# Patient Record
Sex: MALE | Race: WHITE | Employment: FULL TIME | URBAN - METROPOLITAN AREA
[De-identification: names, ages, dates, MRNs, and addresses within clinical notes are randomized per-mention and may not be internally consistent; named-entity substitution may affect disease eponyms.]

---

## 2019-11-25 ENCOUNTER — HOSPITAL ENCOUNTER (EMERGENCY)
Age: 19
Discharge: HOME | End: 2019-11-25
Payer: COMMERCIAL

## 2019-11-25 ENCOUNTER — APPOINTMENT (OUTPATIENT)
Dept: GENERAL RADIOLOGY | Age: 19
End: 2019-11-25
Payer: COMMERCIAL

## 2019-11-25 VITALS
HEART RATE: 86 BPM | DIASTOLIC BLOOD PRESSURE: 52 MMHG | RESPIRATION RATE: 16 BRPM | TEMPERATURE: 98 F | OXYGEN SATURATION: 97 % | SYSTOLIC BLOOD PRESSURE: 167 MMHG

## 2019-11-25 DIAGNOSIS — S61.213A LACERATION OF LEFT MIDDLE FINGER WITHOUT FOREIGN BODY WITHOUT DAMAGE TO NAIL, INITIAL ENCOUNTER: Primary | ICD-10-CM

## 2019-11-25 PROCEDURE — 6370000000 HC RX 637 (ALT 250 FOR IP): Performed by: NURSE PRACTITIONER

## 2019-11-25 PROCEDURE — 2500000003 HC RX 250 WO HCPCS: Performed by: NURSE PRACTITIONER

## 2019-11-25 PROCEDURE — 73130 X-RAY EXAM OF HAND: CPT

## 2019-11-25 PROCEDURE — 99282 EMERGENCY DEPT VISIT SF MDM: CPT

## 2019-11-25 PROCEDURE — 12001 RPR S/N/AX/GEN/TRNK 2.5CM/<: CPT

## 2019-11-25 RX ORDER — IBUPROFEN 800 MG/1
800 TABLET ORAL ONCE
Status: COMPLETED | OUTPATIENT
Start: 2019-11-25 | End: 2019-11-25

## 2019-11-25 RX ORDER — LIDOCAINE HYDROCHLORIDE 10 MG/ML
5 INJECTION, SOLUTION EPIDURAL; INFILTRATION; INTRACAUDAL; PERINEURAL ONCE
Status: COMPLETED | OUTPATIENT
Start: 2019-11-25 | End: 2019-11-25

## 2019-11-25 RX ORDER — DIAPER,BRIEF,INFANT-TODD,DISP
EACH MISCELLANEOUS ONCE
Status: COMPLETED | OUTPATIENT
Start: 2019-11-25 | End: 2019-11-25

## 2019-11-25 RX ORDER — CEPHALEXIN 500 MG/1
500 CAPSULE ORAL 3 TIMES DAILY
Qty: 30 CAPSULE | Refills: 0 | Status: SHIPPED | OUTPATIENT
Start: 2019-11-25 | End: 2019-12-05

## 2019-11-25 RX ORDER — IBUPROFEN 800 MG/1
800 TABLET ORAL EVERY 6 HOURS PRN
Qty: 16 TABLET | Refills: 0 | Status: SHIPPED | OUTPATIENT
Start: 2019-11-25 | End: 2020-09-04 | Stop reason: ALTCHOICE

## 2019-11-25 RX ORDER — BACITRACIN ZINC AND POLYMYXIN B SULFATE 500; 1000 [USP'U]/G; [USP'U]/G
OINTMENT TOPICAL
Qty: 30 G | Refills: 0 | Status: SHIPPED | OUTPATIENT
Start: 2019-11-25 | End: 2019-12-02

## 2019-11-25 RX ADMIN — IBUPROFEN 800 MG: 800 TABLET, FILM COATED ORAL at 11:39

## 2019-11-25 RX ADMIN — BACITRACIN ZINC: 500 OINTMENT TOPICAL at 13:00

## 2019-11-25 RX ADMIN — LIDOCAINE HYDROCHLORIDE 5 ML: 10 INJECTION, SOLUTION EPIDURAL; INFILTRATION; INTRACAUDAL; PERINEURAL at 13:00

## 2019-11-25 SDOH — HEALTH STABILITY: MENTAL HEALTH: HOW OFTEN DO YOU HAVE A DRINK CONTAINING ALCOHOL?: NEVER

## 2019-11-25 ASSESSMENT — PAIN SCALES - GENERAL
PAINLEVEL_OUTOF10: 3
PAINLEVEL_OUTOF10: 3

## 2019-11-27 ENCOUNTER — TELEPHONE (OUTPATIENT)
Dept: ORTHOPEDIC SURGERY | Age: 19
End: 2019-11-27

## 2019-12-02 ENCOUNTER — TELEPHONE (OUTPATIENT)
Dept: ORTHOPEDIC SURGERY | Age: 19
End: 2019-12-02

## 2019-12-02 ENCOUNTER — TELEPHONE (OUTPATIENT)
Dept: ADMINISTRATIVE | Age: 19
End: 2019-12-02

## 2020-09-04 ENCOUNTER — HOSPITAL ENCOUNTER (EMERGENCY)
Age: 20
Discharge: HOME | End: 2020-09-04
Payer: COMMERCIAL

## 2020-09-04 VITALS
HEART RATE: 79 BPM | OXYGEN SATURATION: 98 % | DIASTOLIC BLOOD PRESSURE: 70 MMHG | BODY MASS INDEX: 24.25 KG/M2 | HEIGHT: 68 IN | WEIGHT: 160 LBS | SYSTOLIC BLOOD PRESSURE: 138 MMHG | RESPIRATION RATE: 18 BRPM | TEMPERATURE: 98.7 F

## 2020-09-04 PROCEDURE — 99212 OFFICE O/P EST SF 10 MIN: CPT

## 2020-09-04 NOTE — ED PROVIDER NOTES
Department of Emergency Medicine   ED  Provider Note  Admit Date/RoomTime: 9/4/2020  5:32 PM  ED Room: 04/04  Chief Complaint:   Nasal Congestion and Cough    History of Present Illness   Source of history provided by:  patient. History/Exam Limitations: none. Nery Donohue is a 23 y.o. old male with a past medical history of: History reviewed. No pertinent past medical history. presents to the emergency department with a complaint of cold. Patient states for the past 2 days he has had a head cold. Sinus congestion. Runny nose. Slight cough. Patient denies fevers or chills. Denies shortness of breath. Denies chest pain. Denies nausea, vomiting, diarrhea. ROS   Pertinent positives and negatives are stated within HPI, all other systems reviewed and are negative. Past Surgical History:  has no past surgical history on file. Social History:  reports that he has never smoked. He has never used smokeless tobacco. He reports that he does not drink alcohol or use drugs. Family History: family history is not on file. Allergies: Amoxil [amoxicillin] and Strawberry flavor    Physical Exam           ED Triage Vitals [09/04/20 1733]   BP Temp Temp Source Pulse Resp SpO2 Height Weight   138/70 98.7 °F (37.1 °C) Infrared 79 18 98 % 5' 8\" (1.727 m) 160 lb (72.6 kg)      Oxygen Saturation Interpretation: Normal.    General:  NAD. Alert and Oriented. Well-appearing. Skin:  Warm, dry. No rashes. Head:  Normocephalic. Atraumatic. Eyes:  EOMI. Conjunctiva normal.  ENT:  Oral mucosa moist.  Airway patent. Posterior pharynx with red streaking and cobblestoning to the posterior pharynx wall. Tonsils are not erythematous, not swollen, negative exudate. Bilateral TMs show a slight bulge, negative erythema. Positive nasal congestion with clear drainage. Neck:  Supple. Normal ROM. Respiratory:  No respiratory distress. No labored breathing.   Lungs clear without rales, rhonchi or wheezing. Cardiovascular:  Regular rate. No Murmur. No peripheral edema. Extremities warm and good color. Extremities:  Normal ROM. Nontender to palpation. Atraumatic. Back:  Normal ROM. Nontender to palpation. Neuro:  Alert and Oriented to person, place, time and situation. Normal LOC. Moves all extremities. Speech fluent. Psych:  Calm and Cooperative. Normal thought process. Normal judgement. Lab / Imaging Results   (All laboratory and radiology results have been personally reviewed by myself)  Labs:  No results found for this visit on 09/04/20. Imaging: All Radiology results interpreted by Radiologist unless otherwise noted. No orders to display       ED Course / Medical Decision Making   Medications - No data to display     Re-examination:  9/4/20       Time:     Patients symptoms . Consults:   None    Procedures:   none    Medical Decision Making:    Advised patient that if he wants tested for coronavirus that he can go to any of the outpatient testing centers. A diagnosis of Upper Respiratory Infection is most likely viral in nature. Antibiotics have no effect on viruses and is not prescribed for viral illnesses. It is recommended that you increase your fluid intake; take otc Tylenol or Motrin as needed; place a vaporizer or humidifier in your room to moisten the air; drink warm teas with honey. OTC decongestants may or may not benefit you. URI's can last 10-14 days. If you feel that your symptoms are worsening or you have any concerns please follow up with your family doctor or go to your closest ED/Urgent care. Counseling: The emergency provider has spoken with the patient and discussed todays results, in addition to providing specific details for the plan of care and counseling regarding the diagnosis and prognosis. Questions are answered at this time and they are agreeable with the plan. Assessment     1.  Viral URI with cough New Problem     Plan Discharge to home  Patient condition is good    New Medications     New Prescriptions    No medications on file     Electronically signed by GUI Logan   DD: 9/4/20  **This report was transcribed using voice recognition software. Every effort was made to ensure accuracy; however, inadvertent computerized transcription errors may be present.   END OF ED PROVIDER NOTE       Romina Logan  09/04/20 6011

## 2020-09-05 ENCOUNTER — CARE COORDINATION (OUTPATIENT)
Dept: CARE COORDINATION | Age: 20
End: 2020-09-05

## 2020-09-05 NOTE — CARE COORDINATION
Patient contacted regarding Michelle Petersen. Discussed COVID-19 related testing which was not done at this time. Test results were not done. Patient informed of results, if available? N/A    Care Transition Nurse/ Ambulatory Care Manager contacted the patient by telephone to perform post discharge assessment. Call within 2 business days of discharge: Yes. Verified name and  with patient as identifiers. Provided introduction to self, and explanation of the CTN/ACM role, and reason for call due to risk factors for infection and/or exposure to COVID-19. Symptoms reviewed with patient who verbalized the following symptoms: no new symptoms and no worsening symptoms. Reports that he is feeling better and is taking OTC medications. Due to no new or worsening symptoms encounter was not routed to provider for escalation. Discussed follow-up appointments. If no appointment was previously scheduled, appointment scheduling offered: Yes  [facility] follow up appointment(s): No future appointments. Non-[facility] follow up appointment(s): N/A    Non-face-to-face services provided:  Reports that he will not be following up with his PCP as he feels better and will take OTC medications. Advance Care Planning:   Does patient have an Advance Directive:  reviewed and current. Patient has following risk factors of: no known risk factors. CTN/ACM reviewed discharge instructions, medical action plan and red flags such as increased shortness of breath, increasing fever and signs of decompensation with patient who verbalized understanding. Discussed exposure protocols and quarantine with CDC Guidelines What to do if you are sick with coronavirus disease 2019.  Patient was given an opportunity for questions and concerns. The patient agrees to contact the [facility] exposure line [phone], [facility] department [place] [facility]: ([phone]) and PCP office for questions related to their healthcare.  CTN/ACM provided contact information for future needs. Reviewed and educated patient on any new and changed medications related to discharge diagnosis     Patient/family/caregiver given information for [facility] Loop and agrees to enroll no  Patient's preferred e-mail: N/A   Patient's preferred phone number: N/A  Based on Loop alert triggers, patient will be contacted by nurse care manager for worsening symptoms. Pt declines any further outreaches. based on severity of symptoms and risk factors. Will resolve episode.

## 2023-02-21 ENCOUNTER — OFFICE VISIT (OUTPATIENT)
Dept: FAMILY MEDICINE CLINIC | Age: 23
End: 2023-02-21

## 2023-02-21 VITALS
WEIGHT: 181 LBS | DIASTOLIC BLOOD PRESSURE: 88 MMHG | HEART RATE: 75 BPM | HEIGHT: 68 IN | OXYGEN SATURATION: 97 % | RESPIRATION RATE: 16 BRPM | BODY MASS INDEX: 27.43 KG/M2 | TEMPERATURE: 98.5 F | SYSTOLIC BLOOD PRESSURE: 130 MMHG

## 2023-02-21 DIAGNOSIS — K43.9 VENTRAL HERNIA WITHOUT OBSTRUCTION OR GANGRENE: ICD-10-CM

## 2023-02-21 DIAGNOSIS — Z76.89 ESTABLISHING CARE WITH NEW DOCTOR, ENCOUNTER FOR: ICD-10-CM

## 2023-02-21 DIAGNOSIS — Z01.812 PRE-PROCEDURE LAB EXAM: ICD-10-CM

## 2023-02-21 DIAGNOSIS — Z71.3 DIETARY COUNSELING: ICD-10-CM

## 2023-02-21 DIAGNOSIS — Z71.85 VACCINE COUNSELING: ICD-10-CM

## 2023-02-21 DIAGNOSIS — N50.811 PAIN IN RIGHT TESTICLE: Primary | ICD-10-CM

## 2023-02-21 DIAGNOSIS — Z71.82 EXERCISE COUNSELING: ICD-10-CM

## 2023-02-21 DIAGNOSIS — Q55.22 RETRACTILE TESTIS: ICD-10-CM

## 2023-02-21 LAB
ANION GAP SERPL CALCULATED.3IONS-SCNC: 7 MMOL/L (ref 7–16)
BUN BLDV-MCNC: 17 MG/DL (ref 6–20)
CALCIUM SERPL-MCNC: 9.4 MG/DL (ref 8.6–10.2)
CHLORIDE BLD-SCNC: 105 MMOL/L (ref 98–107)
CO2: 28 MMOL/L (ref 22–29)
CREAT SERPL-MCNC: 1 MG/DL (ref 0.7–1.2)
GFR SERPL CREATININE-BSD FRML MDRD: >60 ML/MIN/1.73
GLUCOSE BLD-MCNC: 97 MG/DL (ref 74–99)
POTASSIUM SERPL-SCNC: 4.2 MMOL/L (ref 3.5–5)
SODIUM BLD-SCNC: 140 MMOL/L (ref 132–146)

## 2023-02-21 RX ORDER — OMEPRAZOLE 20 MG/1
CAPSULE, DELAYED RELEASE ORAL
COMMUNITY
Start: 2023-02-16

## 2023-02-21 SDOH — ECONOMIC STABILITY: INCOME INSECURITY: HOW HARD IS IT FOR YOU TO PAY FOR THE VERY BASICS LIKE FOOD, HOUSING, MEDICAL CARE, AND HEATING?: NOT HARD AT ALL

## 2023-02-21 SDOH — ECONOMIC STABILITY: FOOD INSECURITY: WITHIN THE PAST 12 MONTHS, THE FOOD YOU BOUGHT JUST DIDN'T LAST AND YOU DIDN'T HAVE MONEY TO GET MORE.: NEVER TRUE

## 2023-02-21 SDOH — ECONOMIC STABILITY: HOUSING INSECURITY
IN THE LAST 12 MONTHS, WAS THERE A TIME WHEN YOU DID NOT HAVE A STEADY PLACE TO SLEEP OR SLEPT IN A SHELTER (INCLUDING NOW)?: NO

## 2023-02-21 SDOH — ECONOMIC STABILITY: FOOD INSECURITY: WITHIN THE PAST 12 MONTHS, YOU WORRIED THAT YOUR FOOD WOULD RUN OUT BEFORE YOU GOT MONEY TO BUY MORE.: NEVER TRUE

## 2023-02-21 ASSESSMENT — PATIENT HEALTH QUESTIONNAIRE - PHQ9
SUM OF ALL RESPONSES TO PHQ QUESTIONS 1-9: 0
SUM OF ALL RESPONSES TO PHQ QUESTIONS 1-9: 0
SUM OF ALL RESPONSES TO PHQ9 QUESTIONS 1 & 2: 0
1. LITTLE INTEREST OR PLEASURE IN DOING THINGS: 0
SUM OF ALL RESPONSES TO PHQ QUESTIONS 1-9: 0
2. FEELING DOWN, DEPRESSED OR HOPELESS: 0
SUM OF ALL RESPONSES TO PHQ QUESTIONS 1-9: 0

## 2023-02-21 NOTE — PROGRESS NOTES
Zack Starks (:  2000) is a 20 y.o. male,New patient, here for evaluation of the following chief complaint(s):  Establish Care (Former  Patient), Hernia (Has seen Karine Patel ), and Health Maintenance (Due for HPV Vaccine Flu Vaccine, Covid Vaccine, Tdap, Hep C Screen)         ASSESSMENT/PLAN:  1. Pain in right testicle  -     STEFANIA Raymundo MD, Urology, [address] [place] [address]  2. Retractile testis  -     STEFANIA Raymundo MD, Urology, [address]  3. Ventral hernia without obstruction or gangrene  If alarm signs or symptoms develop as we discussed, report immediately to the emergency department/dial . Ventral hernia, unusual in the epigastrium  Painful when this occurs, reducible when it does  No hematochezia hemoptysis melena   CT abd pelvis with iv contrast to better evaluate   NOT rectus diastasis on exam today   4. Dietary counseling  Counseled the patient on diet, continue to make positive choices. It is important to focus on meeting food group needs with nutrient dense foods and stay within caloric limits. Nutrient dense foods will provide you with vitamins, minerals, and other health promoting nutrients. You should avoid added sugars, saturated fats, hydrogenated oils, and limit sodium intake (this isn't just table salt. .. turn the package over, read the label, stay under 2000 mg or 2g of total sodium daily). Track your calories, this will help you get an understanding of what a proper portion size is. Every day you should eat these:  -Veggies of all types  -Fruits  -Grains (strive for at least half of these to be whole-grain)  -Lean protein (poultry, fish, legumes, nuts)    Stick to the plate diet.    -22% of your dinner plate should be leafy green vegetables, dark green, red and orange vegetables. Do not use high-calorie dressing is a ranch or dressings that are filled with added sugars.   25% of your dinner plate should be whole grains. The remaining 25% of your dinner plate should be a lean protein. Limit your red meat intake to once per week and no more than 4 ounces in any serving.  -No need for second helpings. Limit or avoid these foods:  -Added sugars (less than 10% of your total calories in any given day should be from sugars)  -Saturated fats and hydrogenated oils (less than 10% of your total calories in any given day should be from these)  -Sodium (less than 2000 mg/day)  -Alcoholic beverages (even in moderation, alcohol can cause long-term health issues involving hypertension, electrolyte abnormalities, liver disease and even cancers of the mouth and throat)    Stay hydrated. Unless instructed otherwise by your physician, you should strive to drink at least eight 8 ounce glasses of water daily, some of these being fortified with electrolytes (such as a Pedialyte, or low calorie sports drink). Again, avoid added sugars. 5. Exercise counseling  Counseled the patient on importance of cardiovascular and resistance training. Make every attempt to engage in a level of activity, sustained for at least 30 minutes, where you saturate your undergarments with sweat. This should be done, at a minimum, three times per week. 6. Vaccine counseling  -Risk and benefit discussion  -Review of any pertinent information regarding potential contraindications to receiving particular vaccine(s)  -Review the relevant CDC Vaccine Information Statement(s) (VIS)  -Detailed informed consent for each vaccine  -Addressed all concerns and questions related to vaccines and immunization administration      7. Establishing care with new doctor, encounter for  All personal, family, social, surgical, medical history is reviewed and updated with patient. Allergies, medications updated. List of specialists follows with updated. DM/HM updated. No follow-ups on file.          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 Ayad Manrique is following  [facility] daily     Testicular issue:  -years  -notices right side is riding high, can manually pull down then it goes back up slowly, never into the inguinal canal, always palpable within the scrotum and with palpation it is sometimes dull ache/painful  -no associated penile draining, hematuria, fevers, chills, diaphoresis, b-symptoms    PSYCH  PHQ-9 Total Score: 0 (2/21/2023  3:17 PM)    Preventative:  Health Maintenance   Topic Date Due    HPV vaccine (1 - Male 2-dose series) Never done    Depression Screen  Never done    COVID-19 Vaccine (3 - Booster for Coley Peter series) 11/28/2021    Flu vaccine (1) 02/10/2026 (Originally 8/1/2022)    DTaP/Tdap/Td vaccine (8 - Td or Tdap) 05/21/2031    Hib vaccine  Completed    Meningococcal (ACWY) vaccine  Completed    Hepatitis A vaccine  Aged Out    Pneumococcal 0-64 years Vaccine  Aged Out    Varicella vaccine  Discontinued    Hepatitis C screen  Discontinued    HIV screen  Discontinued           ROS:    Denies 10pt ROS other than noted in HPI. Objective       PHYSICAL EXAM:    /88   Pulse 75   Temp 98.5 °F (36.9 °C) (Temporal)   Resp 16   Ht 5' 8\" (1.727 m)   Wt 181 lb (82.1 kg)   SpO2 97%   BMI 27.52 kg/m²     AVSS    GA: Well-groomed, appears well, no acute distress. HEENT: Atraumatic normocephalic. Extraocular muscles are grossly intact. Pupils are equal round reactive to light. Conjunctiva pink and moist.  Hearing is grossly intact. Nares patent without external drainage. Buccal mucosa pink and moist.  Posterior oropharynx clear without lesion or exudate. NECK: Trachea is midline, supple, nontender, no lymphadenopathy. CARDIO: Regular rate and rhythm without murmur rub or gallop. Cap refill 2+. Radial pulses 2+ bilaterally.     RESPIRATORY: Clear to auscultation bilaterally without wheezes rales or rhonchi. Normal inspiratory and expiratory effort. Normoxic on room air    ABD: flat, normoactive bowel sounds. Soft, nontender, no organomegaly. There is a palpable defect in the epigastrium, no abdominal contents, nothing to reduce but presumed fat. MSK: Structurally appropriate for age. No gross deficit. No rectus diastasis. : chaperoned exam. External genitalia wnl, circumcised. Readily apparent, the right testicle appears transverse and held higher than the left. Manual traction without pain or discomfort, slowly testicle returns to this position. Cremasteric reflex is intact, did not worsen/exacerbate apparent discordance wrt to position of testicles. There is no pain or palpable mass. NEURO: Alert, no gross deficit. PSYCH:  Mood is normal and congruent with affect. No signs of psychomotor retardation or agitation. Thought content seems normal, speech is fluent and non-pressured. SKIN: Generally warm pink and dry. An electronic signature was used to authenticate this note.     --Desi Nguyễn, DO

## 2023-02-21 NOTE — PATIENT INSTRUCTIONS
GERD  -No food within 3 hours of laying to bed (same goes for medications with the exception of sleep aids that are 1 hour prior to bed)  -No drink within 1 hour of laying to bed  -Elevate the head of the bed by 30 degrees (1/2 heigh cinder blocks work well)   -Keep food diary and know/avoid triggers      ___________________________________________________________________    -Carbohydrates limit to 40 to 50g per meal (120g to 150g per day)  -Fats limit to 60g per day (total fat intake should be 30% to 35% of your total daily calories, so restrict to whichever number is lower)   -Of the fats you do eat, never eat trans fats, never eat unsaturated fats, limit your saturated fat intake to less than 20g per day (or 7% of your total daily calories, so restrict to whichever number is lower)  -Cholesterol limit to no more than 300mg per day (200mg per day if you have elevated triglycerides or total cholesterol)  -Sodium less than 2000mg per day    MyFitnessPal or similar application (or track by journal) to monitor calories and macronutrients. This is the most critical component to a heart healthy, balanced diet: honesty, keeping oneself accountable, ensure you are tracking daily goals and meeting them. Consider using service such as [employer] [never select keto, only select mediterranean or everything diets . .. enter the calorie goal above]    Food is medicine! Counseled the patient on importance of cardiovascular and resistance training. Make every attempt to engage in a level of activity, sustained for at least 30 minutes, where you saturate your undergarments with sweat. This should be done, at a minimum, three times per week.

## 2023-03-11 ENCOUNTER — HOSPITAL ENCOUNTER (OUTPATIENT)
Dept: CT IMAGING | Age: 23
End: 2023-03-11
Payer: COMMERCIAL

## 2023-03-11 DIAGNOSIS — K43.9 VENTRAL HERNIA WITHOUT OBSTRUCTION OR GANGRENE: ICD-10-CM

## 2023-03-11 PROCEDURE — 74177 CT ABD & PELVIS W/CONTRAST: CPT

## 2023-03-11 PROCEDURE — 6360000004 HC RX CONTRAST MEDICATION: Performed by: RADIOLOGY

## 2023-03-11 RX ADMIN — IOPAMIDOL 50 ML: 755 INJECTION, SOLUTION INTRAVENOUS at 09:26

## 2023-04-21 ENCOUNTER — NURSE ONLY (OUTPATIENT)
Dept: FAMILY MEDICINE CLINIC | Age: 23
End: 2023-04-21

## 2023-04-21 DIAGNOSIS — Z23 VACCINE FOR HUMAN PAPILLOMA VIRUS (HPV) TYPES 6, 11, 16, AND 18 ADMINISTERED: Primary | ICD-10-CM

## 2023-05-23 ENCOUNTER — APPOINTMENT (OUTPATIENT)
Dept: GENERAL RADIOLOGY | Age: 23
End: 2023-05-23
Payer: COMMERCIAL

## 2023-05-23 ENCOUNTER — HOSPITAL ENCOUNTER (EMERGENCY)
Age: 23
Discharge: HOME | End: 2023-05-23
Payer: COMMERCIAL

## 2023-05-23 VITALS
OXYGEN SATURATION: 97 % | DIASTOLIC BLOOD PRESSURE: 73 MMHG | BODY MASS INDEX: 27.37 KG/M2 | TEMPERATURE: 98.2 F | WEIGHT: 180 LBS | HEART RATE: 69 BPM | RESPIRATION RATE: 18 BRPM | SYSTOLIC BLOOD PRESSURE: 115 MMHG

## 2023-05-23 DIAGNOSIS — S62.124A CLOSED NONDISPLACED FRACTURE OF LUNATE OF RIGHT WRIST, INITIAL ENCOUNTER: Primary | ICD-10-CM

## 2023-05-23 PROCEDURE — 73110 X-RAY EXAM OF WRIST: CPT

## 2023-05-23 PROCEDURE — 29125 APPL SHORT ARM SPLINT STATIC: CPT

## 2023-05-23 PROCEDURE — 99211 OFF/OP EST MAY X REQ PHY/QHP: CPT

## 2023-05-23 PROCEDURE — G0463 HOSPITAL OUTPT CLINIC VISIT: HCPCS

## 2023-05-23 ASSESSMENT — PAIN - FUNCTIONAL ASSESSMENT: PAIN_FUNCTIONAL_ASSESSMENT: NONE - DENIES PAIN

## 2023-05-23 NOTE — DISCHARGE INSTRUCTIONS
Xray shows: IMPRESSION:  Nondisplaced lunate fracture and possible avascular necrosis of the lunate. Please call orthopedics for follow up. Take ibuprofen as directed for pain and inflammation  Maintain splint until follow up with orthopedics.    No lifting right arm

## 2023-05-23 NOTE — ED PROVIDER NOTES
[facility]  EMERGENCY DEPARTMENT ENCOUNTER        NAME: Robin Guerrero  :  2000  MRN:  [id number]  Date of evaluation: 2023  Provider: Mireille Coleman PA-C  PCP: Leilani Thomas DO  Note Started : 5:49 PM EDT 23    Chief Complaint: Wrist Pain (Right wrist pain, for a few months, got worse today, repetitive wrist use at work)      This is a 21-year-old male that presents to urgent care complaining of right wrist pain for the past 4 months. He states he injured his wrist 4 months ago. He does work a active type of job in which he uses his wrist a lot. He noticed today that his wrist area was hurting more. He denies numbness or tingling. He is right-handed. On first contact patient he appears to be in no acute distress. Review of Systems  Pertinent positives and negatives are stated within HPI, all other systems reviewed and are negative. Allergies: Amoxil [amoxicillin], Shellfish-derived products, Strawberry flavor, and Triamcinolone     --------------------------------------------- PAST HISTORY ---------------------------------------------  Past Medical History:  has a past medical history of Environmental allergies. Past Surgical History:  has no past surgical history on file. Social History:  reports that he has never smoked. He has never used smokeless tobacco. He reports that he does not drink alcohol and does not use drugs. Family History: family history includes Diabetes in his paternal grandfather; Heart Attack in his paternal grandfather; High Cholesterol in his father, mother, and paternal grandfather; Hypertension in his mother and paternal grandfather; Mult Sclerosis in his father. The patients home medications have been reviewed. The nursing notes within the ED encounter have been reviewed.      ------------------------------------------------SCREENINGS----------------------------------------------                        ANNALISA

## 2023-05-24 ENCOUNTER — TELEPHONE (OUTPATIENT)
Dept: ADMINISTRATIVE | Age: 23
End: 2023-05-24

## 2023-05-30 ENCOUNTER — OFFICE VISIT (OUTPATIENT)
Dept: ORTHOPEDIC SURGERY | Age: 23
End: 2023-05-30
Payer: COMMERCIAL

## 2023-05-30 VITALS — HEIGHT: 68 IN | WEIGHT: 180 LBS | TEMPERATURE: 98 F | BODY MASS INDEX: 27.28 KG/M2

## 2023-05-30 DIAGNOSIS — M92.211 KIENBOCK'S DISEASE, RIGHT: Primary | ICD-10-CM

## 2023-05-30 DIAGNOSIS — S62.124A CLOSED NONDISPLACED FRACTURE OF LUNATE OF RIGHT WRIST, INITIAL ENCOUNTER: ICD-10-CM

## 2023-05-30 PROCEDURE — 99203 OFFICE O/P NEW LOW 30 MIN: CPT | Performed by: ORTHOPAEDIC SURGERY

## 2023-05-30 NOTE — PROGRESS NOTES
Chief Complaint   Patient presents with    Wrist Injury     Right wrist fx. Patient has been hurting for a few months. Patient was cracking knuckles on 5/23/23 and wrist became swollen and went to . Told he has a fracture in wrist. Patient is right hand dominant. Does now remember any other injury to wrist. Had been trying to take it easy with wrist but now painful. Placed in splint and has still been using wrist.         Tino Kaufman is a 24y.o. year old  male who presents for evaluation of right wrist pain. he reports this started a few months. he does not remember a specific injury that started the pain. .  The injury was none. The pain is located mainly dorsal wrist.  The pain is worse with activity and better with rest.  The patient has tried OTC analgesics, ice, splint in ED . The treatment has not been effective. The patient is right dominant. The patient is employed at labor intensive job. Past Medical History:   Diagnosis Date    Environmental allergies      No past surgical history on file.     Current Outpatient Medications:     omeprazole (PRILOSEC) 20 MG delayed release capsule, , Disp: , Rfl:     VITAMIN D PO, Take by mouth, Disp: , Rfl:   Allergies   Allergen Reactions    Amoxil [Amoxicillin] Other (See Comments)     unknown    Shellfish-Derived Products Hives    Strawberry Flavor Hives    Triamcinolone      Social History     Socioeconomic History    Marital status: Single     Spouse name: Not on file    Number of children: Not on file    Years of education: Not on file    Highest education level: Not on file   Occupational History    Not on file   Tobacco Use    Smoking status: Never    Smokeless tobacco: Never   Vaping Use    Vaping Use: Never used   Substance and Sexual Activity    Alcohol use: Never    Drug use: Never    Sexual activity: Not on file   Other Topics Concern    Not on file   Social History Narrative    Not on file     Social Determinants of Health     Financial

## 2024-01-03 ENCOUNTER — EVALUATION (OUTPATIENT)
Dept: OCCUPATIONAL THERAPY | Age: 24
End: 2024-01-03
Payer: COMMERCIAL

## 2024-01-03 DIAGNOSIS — M92.211 OSTEOCHONDROSIS OF LUNATE OF RIGHT WRIST: Primary | ICD-10-CM

## 2024-01-03 PROCEDURE — 97165 OT EVAL LOW COMPLEX 30 MIN: CPT | Performed by: OCCUPATIONAL THERAPIST

## 2024-01-03 PROCEDURE — 97530 THERAPEUTIC ACTIVITIES: CPT | Performed by: OCCUPATIONAL THERAPIST

## 2024-01-03 NOTE — PROGRESS NOTES
OCCUPATIONAL THERAPY INITIAL EVALUATION    [facility] SPECIALTY [facility] OCCUPATIONAL THERAPY   [place] [address]  Dept: [phone]  Loc: [phone]   [facility] OT Fax: [phone]    Date:  1/3/2024  Initial Evaluation Date: 1/3/2024   Evaluating Therapist: Nupur Starr OT    Patient Name:  Maxx Espinosa    :  2000    Restrictions/Precautions:  Per Radial Osteotomy w/ Lunate Revascularization Pin Protocol, low fall risk  Diagnosis:  R radial osteotomy and lunate revascularization pin neuroma excision and burying of nerve and muscle;   (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 Malu Elliott MD  Specific Practitioner Orders: Eval and treat    Assessment of current deficits   [x] IADLs  [x] Strength   [x] ROM  [x] Edema   [x] Scar Adhesion/Skin Integrity   [x] Motor Endurance     OT PLAN OF CARE   OT POC based on physician orders, patient diagnosis and results of clinical assessment.    Frequency/Duration: 1-2x/wk for 14 visits  /  Certification Period From: 1/3/2024  To: 4/3/2024    Specific OT Treatment to include:   [x] Instruction in HEP                   Modalities:  [x] Therapeutic Exercise        [x] Ultrasound             [x] PROM/Stretching                    [x] Fluidotherapy          [x]  Paraffin                   [x] AAROM  [x] AROM                 [x] Iontophoresis:   [x] Tendon Glides                         [x] Electrical Stimulation/Attended                    [x] ADL/IADL re-training         [x] Desensitization/Nerve Stimulation     [x] Therapeutic Activity       [x] Pain Management with/without modalities PRN  [x] Manual Therapy                      [x] Splinting                      [x] Scar Management                   [x] Joint

## 2024-01-10 ENCOUNTER — TREATMENT (OUTPATIENT)
Dept: OCCUPATIONAL THERAPY | Age: 24
End: 2024-01-10
Payer: COMMERCIAL

## 2024-01-10 DIAGNOSIS — M92.211 OSTEOCHONDROSIS OF LUNATE OF RIGHT WRIST: Primary | ICD-10-CM

## 2024-01-10 PROCEDURE — 97110 THERAPEUTIC EXERCISES: CPT | Performed by: OCCUPATIONAL THERAPIST

## 2024-01-10 PROCEDURE — 97035 APP MDLTY 1+ULTRASOUND EA 15: CPT | Performed by: OCCUPATIONAL THERAPIST

## 2024-01-10 PROCEDURE — 97140 MANUAL THERAPY 1/> REGIONS: CPT | Performed by: OCCUPATIONAL THERAPIST

## 2024-01-10 PROCEDURE — 97022 WHIRLPOOL THERAPY: CPT | Performed by: OCCUPATIONAL THERAPIST

## 2024-01-10 NOTE — PROGRESS NOTES
OCCUPATIONAL THERAPY DAILY NOTE  [facility] PHYSICIANS [place] SPECIALTY CARE [facility] OCCUPATIONAL THERAPY   [place] [address]  Dept: [phone]  Loc: [phone]   [facility] OT Fax: [phone]      Date:  1/10/2024  Initial Evaluation Date: 1-3-24     Evaluating Therapist: Nupur Starr OT     Patient Name:  Maxx Espinosa    :  2000    Restrictions/Precautions:  Per Radial Osteotomy w/ Lunate Revascularization Pin Protocol, low fall risk  Diagnosis:  R radial osteotomy and lunate revascularization pin neuroma excision and burying of nerve and muscle;   (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 Malu Elliott MD  Specific Practitioner Orders: Eval and treat     Assessment of current deficits   [x] IADLs         [x] Strength      [x] ROM          [x] Edema         [x] Scar Adhesion/Skin Integrity   [x] Motor Endurance      OT PLAN OF CARE   OT POC based on physician orders, patient diagnosis and results of clinical assessment.     Frequency/Duration: 1-2x/wk for 14 visits  /  Certification Period From: 1/3/2024  To: 4/3/2024     Specific OT Treatment to include:   [x] Instruction in HEP                   Modalities:  [x] Therapeutic Exercise                 [x] Ultrasound             [x] PROM/Stretching                    [x] Fluidotherapy          [x]  Paraffin                   [x] AAROM  [x] AROM                 [x] Iontophoresis:   [x] Tendon Glides                         [x] Electrical Stimulation/Attended                    [x] ADL/IADL re-training                 [x] Desensitization/Nerve Stimulation     [x] Therapeutic Activity                  [x] Pain Management with/without modalities PRN  [x] Manual Therapy                      [x] Splinting

## 2024-01-12 ENCOUNTER — TREATMENT (OUTPATIENT)
Dept: OCCUPATIONAL THERAPY | Age: 24
End: 2024-01-12
Payer: COMMERCIAL

## 2024-01-12 DIAGNOSIS — M92.211 OSTEOCHONDROSIS OF LUNATE OF RIGHT WRIST: Primary | ICD-10-CM

## 2024-01-12 PROCEDURE — 97022 WHIRLPOOL THERAPY: CPT | Performed by: OCCUPATIONAL THERAPIST

## 2024-01-12 PROCEDURE — 97530 THERAPEUTIC ACTIVITIES: CPT | Performed by: OCCUPATIONAL THERAPIST

## 2024-01-12 PROCEDURE — 97110 THERAPEUTIC EXERCISES: CPT | Performed by: OCCUPATIONAL THERAPIST

## 2024-01-12 PROCEDURE — 97035 APP MDLTY 1+ULTRASOUND EA 15: CPT | Performed by: OCCUPATIONAL THERAPIST

## 2024-01-12 NOTE — PROGRESS NOTES
OCCUPATIONAL THERAPY DAILY NOTE  [facility] PHYSICIANS [place] SPECIALTY CARE [facility] OCCUPATIONAL THERAPY   [place] [address]  Dept: [phone]  Loc: [phone]   [facility] OT Fax: [phone]      Date:  2024  Initial Evaluation Date: 1-3-24     Evaluating Therapist: Nupur Starr OT     Patient Name:  Maxx Espinosa    :  2000    Restrictions/Precautions:  Per Radial Osteotomy w/ Lunate Revascularization Pin Protocol, low fall risk  Diagnosis:  R radial osteotomy and lunate revascularization pin neuroma excision and burying of nerve and muscle;   (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 Mlau Elliott MD  Specific Practitioner Orders: Eval and treat     Assessment of current deficits   [x] IADLs         [x] Strength      [x] ROM          [x] Edema         [x] Scar Adhesion/Skin Integrity   [x] Motor Endurance      OT PLAN OF CARE   OT POC based on physician orders, patient diagnosis and results of clinical assessment.     Frequency/Duration: 1-2x/wk for 14 visits  /  Certification Period From: 1/3/2024  To: 4/3/2024     Specific OT Treatment to include:   [x] Instruction in HEP                   Modalities:  [x] Therapeutic Exercise                 [x] Ultrasound             [x] PROM/Stretching                    [x] Fluidotherapy          [x]  Paraffin                   [x] AAROM  [x] AROM                 [x] Iontophoresis:   [x] Tendon Glides                         [x] Electrical Stimulation/Attended                    [x] ADL/IADL re-training                 [x] Desensitization/Nerve Stimulation     [x] Therapeutic Activity                  [x] Pain Management with/without modalities PRN  [x] Manual Therapy                      [x] Splinting

## 2024-01-16 ENCOUNTER — TREATMENT (OUTPATIENT)
Dept: OCCUPATIONAL THERAPY | Age: 24
End: 2024-01-16
Payer: COMMERCIAL

## 2024-01-16 DIAGNOSIS — M92.211 OSTEOCHONDROSIS OF LUNATE OF RIGHT WRIST: Primary | ICD-10-CM

## 2024-01-16 PROCEDURE — 97022 WHIRLPOOL THERAPY: CPT | Performed by: OCCUPATIONAL THERAPIST

## 2024-01-16 PROCEDURE — 97110 THERAPEUTIC EXERCISES: CPT | Performed by: OCCUPATIONAL THERAPIST

## 2024-01-16 PROCEDURE — 97140 MANUAL THERAPY 1/> REGIONS: CPT | Performed by: OCCUPATIONAL THERAPIST

## 2024-01-16 NOTE — PROGRESS NOTES
OCCUPATIONAL THERAPY DAILY NOTE  [facility] PHYSICIANS [place] SPECIALTY CARE [facility] OCCUPATIONAL THERAPY   [address] [place] [place] [address]  Dept: [phone]  Loc: [phone]   [facility] OT Fax: [phone]      Date:  2024  Initial Evaluation Date: 1-3-24     Evaluating Therapist: Nupur Starr OT     Patient Name:  Maxx Espinosa    :  2000    Restrictions/Precautions:  Per Radial Osteotomy w/ Lunate Revascularization Pin Protocol, low fall risk  Diagnosis:  R radial osteotomy and lunate revascularization pin neuroma excision and burying of nerve and muscle;   (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 Malu Elliott MD  Specific Practitioner Orders: Eval and treat     Assessment of current deficits   [x] IADLs         [x] Strength      [x] ROM          [x] Edema         [x] Scar Adhesion/Skin Integrity   [x] Motor Endurance      OT PLAN OF CARE   OT POC based on physician orders, patient diagnosis and results of clinical assessment.     Frequency/Duration: 1-2x/wk for 14 visits  /  Certification Period From: 1/3/2024  To: 4/3/2024     Specific OT Treatment to include:   [x] Instruction in HEP                   Modalities:  [x] Therapeutic Exercise                 [x] Ultrasound             [x] PROM/Stretching                    [x] Fluidotherapy          [x]  Paraffin                   [x] AAROM  [x] AROM                 [x] Iontophoresis:   [x] Tendon Glides                         [x] Electrical Stimulation/Attended                    [x] ADL/IADL re-training                 [x] Desensitization/Nerve Stimulation     [x] Therapeutic Activity                  [x] Pain Management with/without modalities

## 2024-01-19 ENCOUNTER — TREATMENT (OUTPATIENT)
Dept: OCCUPATIONAL THERAPY | Age: 24
End: 2024-01-19

## 2024-01-19 DIAGNOSIS — M92.211 OSTEOCHONDROSIS OF LUNATE OF RIGHT WRIST: Primary | ICD-10-CM

## 2024-01-19 NOTE — PROGRESS NOTES
OCCUPATIONAL THERAPY DAILY NOTE  [facility] PHYSICIANS [place] SPECIALTY CARE [facility] OCCUPATIONAL THERAPY   [address] [place] [address]  Dept: [phone]  Loc: [phone]   [facility] OT Fax: [phone]      Date:  2024  Initial Evaluation Date: 1-3-24     Evaluating Therapist: Nupur Starr OT     Patient Name:  Maxx Espinosa    :  2000    Restrictions/Precautions:  Per Radial Osteotomy w/ Lunate Revascularization Pin Protocol, low fall risk  Diagnosis:  R radial osteotomy and lunate revascularization pin neuroma excision and burying of nerve and muscle;   (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 Malu Elliott MD  Specific Practitioner Orders: Eval and treat     Assessment of current deficits   [x] IADLs         [x] Strength      [x] ROM          [x] Edema         [x] Scar Adhesion/Skin Integrity   [x] Motor Endurance      OT PLAN OF CARE   OT POC based on physician orders, patient diagnosis and results of clinical assessment.     Frequency/Duration: 1-2x/wk for 14 visits  /  Certification Period From: 1/3/2024  To: 4/3/2024     Specific OT Treatment to include:   [x] Instruction in HEP                   Modalities:  [x] Therapeutic Exercise                 [x] Ultrasound             [x] PROM/Stretching                    [x] Fluidotherapy          [x]  Paraffin                   [x] AAROM  [x] AROM                 [x] Iontophoresis:   [x] Tendon Glides                         [x] Electrical Stimulation/Attended                    [x] ADL/IADL re-training                 [x] Desensitization/Nerve Stimulation     [x] Therapeutic Activity                  [x] Pain Management with/without modalities

## 2024-01-23 ENCOUNTER — TREATMENT (OUTPATIENT)
Dept: OCCUPATIONAL THERAPY | Age: 24
End: 2024-01-23
Payer: COMMERCIAL

## 2024-01-23 DIAGNOSIS — M92.211 OSTEOCHONDROSIS OF LUNATE OF RIGHT WRIST: Primary | ICD-10-CM

## 2024-01-23 PROCEDURE — 97140 MANUAL THERAPY 1/> REGIONS: CPT | Performed by: OCCUPATIONAL THERAPY ASSISTANT

## 2024-01-23 PROCEDURE — 97110 THERAPEUTIC EXERCISES: CPT | Performed by: OCCUPATIONAL THERAPY ASSISTANT

## 2024-01-23 PROCEDURE — 97022 WHIRLPOOL THERAPY: CPT | Performed by: OCCUPATIONAL THERAPY ASSISTANT

## 2024-01-23 NOTE — PROGRESS NOTES
OCCUPATIONAL THERAPY DAILY NOTE  [facility] PHYSICIANS [place] SPECIALTY CARE [facility] OCCUPATIONAL THERAPY   [address] [place] [address]  Dept: [phone]  Loc: [phone]   [facility] OT Fax: [phone]      Date:  2024  Initial Evaluation Date: 1-3-24     Evaluating Therapist: Nupur Starr OT     Patient Name:  Maxx Espinosa    :  2000    Restrictions/Precautions:  Per Radial Osteotomy w/ Lunate Revascularization Pin Protocol, low fall risk  Diagnosis:  R radial osteotomy and lunate revascularization pin neuroma excision and burying of nerve and muscle;   (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 Malu Elliott MD  Specific Practitioner Orders: Eval and treat     Assessment of current deficits   [x] IADLs         [x] Strength      [x] ROM          [x] Edema         [x] Scar Adhesion/Skin Integrity   [x] Motor Endurance      OT PLAN OF CARE   OT POC based on physician orders, patient diagnosis and results of clinical assessment.     Frequency/Duration: 1-2x/wk for 14 visits  /  Certification Period From: 1/3/2024  To: 4/3/2024     Specific OT Treatment to include:   [x] Instruction in HEP                   Modalities:  [x] Therapeutic Exercise                 [x] Ultrasound             [x] PROM/Stretching                    [x] Fluidotherapy          [x]  Paraffin                   [x] AAROM  [x] AROM                 [x] Iontophoresis:   [x] Tendon Glides                         [x] Electrical Stimulation/Attended                    [x] ADL/IADL re-training                 [x] Desensitization/Nerve Stimulation     [x] Therapeutic Activity                  [x] Pain Management with/without modalities

## 2024-01-26 ENCOUNTER — TREATMENT (OUTPATIENT)
Dept: OCCUPATIONAL THERAPY | Age: 24
End: 2024-01-26

## 2024-01-26 DIAGNOSIS — M92.211 OSTEOCHONDROSIS OF LUNATE OF RIGHT WRIST: Primary | ICD-10-CM

## 2024-01-26 NOTE — PROGRESS NOTES
OCCUPATIONAL THERAPY DAILY NOTE  [facility] PHYSICIANS [place] SPECIALTY CARE [facility] OCCUPATIONAL THERAPY   [place] [address]  Dept: [phone]  Loc: [phone]   [facility] OT Fax: [phone]      Date:  2024  Initial Evaluation Date: 1-3-24     Evaluating Therapist: Nupur Starr OT     Patient Name:  Maxx Espinosa    :  2000    Restrictions/Precautions:  Per Radial Osteotomy w/ Lunate Revascularization Pin Protocol, low fall risk  Diagnosis:  R radial osteotomy and lunate revascularization pin neuroma excision and burying of nerve and muscle;   (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 Malu Elliott MD  Specific Practitioner Orders: Eval and treat     Assessment of current deficits   [x] IADLs         [x] Strength      [x] ROM          [x] Edema         [x] Scar Adhesion/Skin Integrity   [x] Motor Endurance      OT PLAN OF CARE   OT POC based on physician orders, patient diagnosis and results of clinical assessment.     Frequency/Duration: 1-2x/wk for 14 visits  /  Certification Period From: 1/3/2024  To: 4/3/2024     Specific OT Treatment to include:   [x] Instruction in HEP                   Modalities:  [x] Therapeutic Exercise                 [x] Ultrasound             [x] PROM/Stretching                    [x] Fluidotherapy          [x]  Paraffin                   [x] AAROM  [x] AROM                 [x] Iontophoresis:   [x] Tendon Glides                         [x] Electrical Stimulation/Attended                    [x] ADL/IADL re-training                 [x] Desensitization/Nerve Stimulation     [x] Therapeutic Activity                  [x] Pain Management with/without

## 2024-01-29 ENCOUNTER — TREATMENT (OUTPATIENT)
Dept: OCCUPATIONAL THERAPY | Age: 24
End: 2024-01-29
Payer: COMMERCIAL

## 2024-01-29 DIAGNOSIS — M92.211 OSTEOCHONDROSIS OF LUNATE OF RIGHT WRIST: Primary | ICD-10-CM

## 2024-01-29 PROCEDURE — 97022 WHIRLPOOL THERAPY: CPT | Performed by: OCCUPATIONAL THERAPY ASSISTANT

## 2024-01-29 PROCEDURE — 97110 THERAPEUTIC EXERCISES: CPT | Performed by: OCCUPATIONAL THERAPY ASSISTANT

## 2024-01-29 PROCEDURE — 97140 MANUAL THERAPY 1/> REGIONS: CPT | Performed by: OCCUPATIONAL THERAPY ASSISTANT

## 2024-01-29 NOTE — PROGRESS NOTES
X  X  x - tendon glides 10x- much looser  - isolated MCP flex/ ext 10x  - Fingertip curls 10x    R wrist / forearm AROM (gentle) X  X    X  x        x - gentle wrist AROM all planes as tolerated  - tabletop ball roll for flexion and extension ^ with 10 sec hold each direction- 10x  - ball 2 hands rotate supination/ pronation- 10x  - wrist over incline with ball roll up into wrist extension- 10x  - Place and hold wrist in extension / flexion 5x with 10 sec hold  - Wrist Jux-a-ciser - 2x with compensation as needed.              PROM/Stretching:      wrist PROM x - flexion/ extension and deviations as tolerated- very tight in flexion        Manual techniques:     Scar massage x - manual--> encouraged for home 3x/ day        Strengthening:     R hand  X  X    x -Red sponge- 10 reps x2.   - Putty ex- xsoft/ gripping full amount 10x and half 10x ---> continue at home as tolerated/ do not over do  -Red digiflex ex's- composite- 20 reps individual- 10 reps         Other:     HEP/ gentle X  X  X  X  x - ball activity for wrist flex/ ext and forearm rotation  - scar massage  - Wrist AROM all planes  - tendon glides  - splint for activity/ off for ROM, hygiene and for light use only- no lifting/ pushing/ pulling          Assessment/Comments:  Pt. Tolerated all exercises and stretches today. Continue to focus on ROM and light hand strengthening. Will continue to advance as tolerated within precautions.     -Rehab Potential: Good   -Patient Response to Treatment: Pt feels the wrist is getting better. .     Patient. Education:  [x] Plans/Goals, Risks/Benefits discussed  [x] Home exercise program  Method of Education: [x] Verbal  [x] Demo  [] Written  Comprehension of Education:  [x] Verbalizes understanding.  [x] Demonstrates understanding.  [] Needs Review.  [] Demonstrates/verbalizes understanding of HEP/Ed previously given.      Time In: 1500          Time Out: 1600           CODE  Minutes  Units   73207 Fluidotherapy 10 1

## 2024-01-31 ENCOUNTER — TREATMENT (OUTPATIENT)
Dept: OCCUPATIONAL THERAPY | Age: 24
End: 2024-01-31
Payer: COMMERCIAL

## 2024-01-31 DIAGNOSIS — M92.211 OSTEOCHONDROSIS OF LUNATE OF RIGHT WRIST: Primary | ICD-10-CM

## 2024-01-31 PROCEDURE — 97022 WHIRLPOOL THERAPY: CPT | Performed by: OCCUPATIONAL THERAPIST

## 2024-01-31 PROCEDURE — 97140 MANUAL THERAPY 1/> REGIONS: CPT | Performed by: OCCUPATIONAL THERAPIST

## 2024-01-31 PROCEDURE — 97110 THERAPEUTIC EXERCISES: CPT | Performed by: OCCUPATIONAL THERAPIST

## 2024-01-31 NOTE — PROGRESS NOTES
OCCUPATIONAL THERAPY DAILY NOTE/ UPDATE  [facility] PHYSICIANS [place] SPECIALTY CARE 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 Malu Elliott MD  Specific Practitioner Orders: Eval and treat     Assessment of current deficits   [x] IADLs         [x] Strength      [x] ROM          [x] Edema         [x] Scar Adhesion/Skin Integrity   [x] Motor Endurance      OT PLAN OF CARE   OT POC based on physician orders, patient diagnosis and results of clinical assessment.     Frequency/Duration: 1-2x/wk for 14 visits  /  Certification Period From: 1/3/2024  To: 4/3/2024     Specific OT Treatment to include:   [x] Instruction in HEP                   Modalities:  [x] Therapeutic Exercise                 [x] Ultrasound             [x] PROM/Stretching                    [x] Fluidotherapy          [x]  Paraffin                   [x] AAROM  [x] AROM                 [x] Iontophoresis:   [x] Tendon Glides                         [x] Electrical Stimulation/Attended                    [x] ADL/IADL re-training                 [x] Desensitization/Nerve Stimulation     [x] Therapeutic Activity                  [x] Pain Management

## 2024-02-05 ENCOUNTER — TREATMENT (OUTPATIENT)
Dept: OCCUPATIONAL THERAPY | Age: 24
End: 2024-02-05
Payer: COMMERCIAL

## 2024-02-05 DIAGNOSIS — M92.211 OSTEOCHONDROSIS OF LUNATE OF RIGHT WRIST: Primary | ICD-10-CM

## 2024-02-05 PROCEDURE — 97140 MANUAL THERAPY 1/> REGIONS: CPT | Performed by: OCCUPATIONAL THERAPY ASSISTANT

## 2024-02-05 PROCEDURE — 97110 THERAPEUTIC EXERCISES: CPT | Performed by: OCCUPATIONAL THERAPY ASSISTANT

## 2024-02-05 PROCEDURE — 97022 WHIRLPOOL THERAPY: CPT | Performed by: OCCUPATIONAL THERAPY ASSISTANT

## 2024-02-05 NOTE — PROGRESS NOTES
OCCUPATIONAL THERAPY DAILY NOTE  [facility] PHYSICIANS [place] SPECIALTY CARE [facility] OCCUPATIONAL THERAPY   [place] [address]  Dept: [phone]  Loc: [phone]   [facility] OT Fax: [phone]      Date:  2024  Initial Evaluation Date: 1-3-24     Evaluating Therapist: Nupur Starr OT     Patient Name:  Maxx Espinosa    :  2000    Restrictions/Precautions:  Per Radial Osteotomy w/ Lunate Revascularization Pin Protocol, low fall risk  Diagnosis:  R radial osteotomy and lunate revascularization pin neuroma excision and burying of nerve and muscle;   (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 Malu Elliott MD  Specific Practitioner Orders: Eval and treat     Assessment of current deficits   [x] IADLs         [x] Strength      [x] ROM          [x] Edema         [x] Scar Adhesion/Skin Integrity   [x] Motor Endurance      OT PLAN OF CARE   OT POC based on physician orders, patient diagnosis and results of clinical assessment.     Frequency/Duration: 1-2x/wk for 14 visits  /  Certification Period From: 1/3/2024  To: 4/3/2024     Specific OT Treatment to include:   [x] Instruction in HEP                   Modalities:  [x] Therapeutic Exercise                 [x] Ultrasound             [x] PROM/Stretching                    [x] Fluidotherapy          [x]  Paraffin                   [x] AAROM  [x] AROM                 [x] Iontophoresis:   [x] Tendon Glides                         [x] Electrical Stimulation/Attended                    [x] ADL/IADL re-training                 [x] Desensitization/Nerve Stimulation     [x] Therapeutic Activity                  [x] Pain Management with/without

## 2024-02-07 ENCOUNTER — TREATMENT (OUTPATIENT)
Dept: OCCUPATIONAL THERAPY | Age: 24
End: 2024-02-07
Payer: COMMERCIAL

## 2024-02-07 DIAGNOSIS — M92.211 OSTEOCHONDROSIS OF LUNATE OF RIGHT WRIST: Primary | ICD-10-CM

## 2024-02-07 PROCEDURE — 97110 THERAPEUTIC EXERCISES: CPT | Performed by: OCCUPATIONAL THERAPY ASSISTANT

## 2024-02-07 PROCEDURE — 97022 WHIRLPOOL THERAPY: CPT | Performed by: OCCUPATIONAL THERAPY ASSISTANT

## 2024-02-07 PROCEDURE — 97140 MANUAL THERAPY 1/> REGIONS: CPT | Performed by: OCCUPATIONAL THERAPY ASSISTANT

## 2024-02-07 NOTE — PROGRESS NOTES
tightness in surrounding tissues)  7) Patient will decrease QuickDASH score to 20% or less for increased participation in daily functional activities.      TODAY'S TREATMENT     Pain Level: . 1/10 pain  Subjective:  Pt.  presents with no new complaints.    Objective:    Updated POC to be completed by visit 14.    INTERVENTION: COMPLETED: SPECIFICS/COMMENTS:   Modality:     R fluidotherapy x - 10 min with gentle wrist / hand ROM   R US x wrist  - 5 min 50%/ .7 to decrease swelling and boost circulation for healing   AROM:     R hand AROM X  X  x - tendon glides 10x- much looser  - isolated MCP flex/ ext 10x  - Fingertip curls 10x    R wrist / forearm AROM (gentle) X  X    X  x        x - gentle wrist AROM all planes as tolerated  - tabletop ball roll for flexion and extension ^ with 10 sec hold each direction- 10x  - ball 2 hands rotate supination/ pronation- 10x  - wrist over incline with ball roll up into wrist extension- 10x  - Place and hold wrist in extension / flexion 5x with 10 sec hold  - Wrist Jux-a-ciser - 5x with compensation as needed.              PROM/Stretching:      wrist PROM x - flexion/ extension and deviations as tolerated- very tight in flexion  - weighted stretch 20 sec - 5x for wrist flexion/ extension        Manual techniques:     Scar massage x - manual--> encouraged for home 3x/ day        Strengthening:     R hand        X    x -Red sponge- 10 reps x2.   - Putty ex- xsoft/ gripping full amount 10x and half 10x ---> continue at home as tolerated/ do not over do  -Red digiflex ex's- composite- 20 reps individual- 20^ reps   -Power hand gripper 2nd setting to pick and pass pom poms.    R wrist PREs x - 1# wrist flexion, extension and deviations 10 reps x2. Each. (Better tolerance today)    Other:     HEP/ gentle X  X  X  X  x - ball activity for wrist flex/ ext and forearm rotation  - scar massage  - Wrist AROM all planes  - tendon glides  - splint for activity/ off for ROM, hygiene and for

## 2024-02-14 ENCOUNTER — TREATMENT (OUTPATIENT)
Dept: OCCUPATIONAL THERAPY | Age: 24
End: 2024-02-14
Payer: COMMERCIAL

## 2024-02-14 DIAGNOSIS — M92.211 OSTEOCHONDROSIS OF LUNATE OF RIGHT WRIST: Primary | ICD-10-CM

## 2024-02-14 PROCEDURE — 97140 MANUAL THERAPY 1/> REGIONS: CPT | Performed by: OCCUPATIONAL THERAPY ASSISTANT

## 2024-02-14 PROCEDURE — 97022 WHIRLPOOL THERAPY: CPT | Performed by: OCCUPATIONAL THERAPY ASSISTANT

## 2024-02-14 PROCEDURE — 97110 THERAPEUTIC EXERCISES: CPT | Performed by: OCCUPATIONAL THERAPY ASSISTANT

## 2024-02-14 NOTE — PROGRESS NOTES
OCCUPATIONAL THERAPY DAILY NOTE  [facility] PHYSICIANS [place] SPECIALTY CARE 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 Malu Elliott MD  Specific Practitioner Orders: Eval and treat     Assessment of current deficits   [x] IADLs         [x] Strength      [x] ROM          [x] Edema         [x] Scar Adhesion/Skin Integrity   [x] Motor Endurance      OT PLAN OF CARE   OT POC based on physician orders, patient diagnosis and results of clinical assessment.     Frequency/Duration: 1-2x/wk for 14 visits  /  Certification Period From: 1/3/2024  To: 4/3/2024     Specific OT Treatment to include:   [x] Instruction in HEP                   Modalities:  [x] Therapeutic Exercise                 [x] Ultrasound             [x] PROM/Stretching                    [x] Fluidotherapy          [x]  Paraffin                   [x] AAROM  [x] AROM                 [x] Iontophoresis:   [x] Tendon Glides                         [x] Electrical Stimulation/Attended                    [x] ADL/IADL re-training                 [x] Desensitization/Nerve Stimulation     [x] Therapeutic Activity                  [x] Pain Management with/without

## 2024-03-08 ENCOUNTER — TREATMENT (OUTPATIENT)
Dept: OCCUPATIONAL THERAPY | Age: 24
End: 2024-03-08

## 2024-03-08 DIAGNOSIS — M92.211 OSTEOCHONDROSIS OF LUNATE OF RIGHT WRIST: Primary | ICD-10-CM

## 2024-03-08 NOTE — PROGRESS NOTES
OCCUPATIONAL THERAPY PROGRESS NOTE/ DISCHARGE SUMMARY  [facility] PHYSICIANS [place] SPECIALTY CARE [facility] OCCUPATIONAL THERAPY  [address]  [place] [address]  Dept: [phone]  Loc: [phone]   [facility] OT Fax: [phone]      Date:  3/8/2024  Initial Evaluation Date: 1-3-24     Evaluating Therapist: Nupur Starr OT     Patient Name:  Maxx Espinosa    :  2000    Restrictions/Precautions:  Per Radial Osteotomy w/ Lunate Revascularization Pin Protocol, low fall risk  Diagnosis:  R radial osteotomy and lunate revascularization pin neuroma excision and burying of nerve and muscle;   (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 Malu Elliott MD  Specific Practitioner Orders: Eval and treat     Assessment of current deficits   [x] IADLs         [x] Strength      [x] ROM          [x] Edema         [x] Scar Adhesion/Skin Integrity   [x] Motor Endurance      OT PLAN OF CARE   OT POC based on physician orders, patient diagnosis and results of clinical assessment.     Frequency/Duration: 1-2x/wk for 14 visits  /  Certification Period From: 1/3/2024  To: 4/3/2024     Specific OT Treatment to include:   [x] Instruction in HEP                   Modalities:  [x] Therapeutic Exercise                 [x] Ultrasound             [x] PROM/Stretching                    [x] Fluidotherapy          [x]  Paraffin                   [x] AAROM  [x] AROM                 [x] Iontophoresis:   [x] Tendon Glides                         [x] Electrical Stimulation/Attended                    [x] ADL/IADL re-training                 [x] Desensitization/Nerve Stimulation     [x] Therapeutic Activity